# Patient Record
Sex: MALE | Race: WHITE | NOT HISPANIC OR LATINO | URBAN - METROPOLITAN AREA
[De-identification: names, ages, dates, MRNs, and addresses within clinical notes are randomized per-mention and may not be internally consistent; named-entity substitution may affect disease eponyms.]

---

## 2018-05-21 ENCOUNTER — EMERGENCY (EMERGENCY)
Facility: HOSPITAL | Age: 23
LOS: 1 days | Discharge: HOME | End: 2018-05-21
Admitting: EMERGENCY MEDICINE
Payer: COMMERCIAL

## 2018-05-21 VITALS
OXYGEN SATURATION: 100 % | RESPIRATION RATE: 16 BRPM | TEMPERATURE: 98 F | HEART RATE: 70 BPM | SYSTOLIC BLOOD PRESSURE: 145 MMHG | DIASTOLIC BLOOD PRESSURE: 95 MMHG
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
## 2018-05-21 PROCEDURE — 99283 EMERGENCY DEPT VISIT LOW MDM: CPT

## 2018-05-21 RX ORDER — FAMOTIDINE 10 MG/ML
20 INJECTION INTRAVENOUS ONCE
Qty: 0 | Refills: 0 | Status: COMPLETED | OUTPATIENT
Start: 2018-05-21 | End: 2018-05-21

## 2018-05-21 RX ORDER — DEXAMETHASONE 0.5 MG/5ML
10 ELIXIR ORAL ONCE
Qty: 0 | Refills: 0 | Status: COMPLETED | OUTPATIENT
Start: 2018-05-21 | End: 2018-05-21

## 2018-05-21 RX ORDER — DIPHENHYDRAMINE HCL 50 MG
25 CAPSULE ORAL EVERY 4 HOURS
Qty: 0 | Refills: 0 | Status: DISCONTINUED | OUTPATIENT
Start: 2018-05-21 | End: 2018-05-21

## 2018-05-21 RX ADMIN — FAMOTIDINE 20 MILLIGRAM(S): 10 INJECTION INTRAVENOUS at 17:54

## 2018-05-21 RX ADMIN — Medication 25 MILLIGRAM(S): at 17:54

## 2018-05-21 NOTE — ED ADULT TRIAGE NOTE - CHIEF COMPLAINT QUOTE
Allergic reaction after eating sushi with nuts , patient comes to ER complaining of tingles to lip and throat , no airway compromise noted at this time. Patient took benadryl 25mg piror to arrival

## 2018-05-21 NOTE — ED PROVIDER NOTE - MEDICAL DECISION MAKING DETAILS
pt with mild allergic response after eating a cashew and is very well appearing in ED.  pt deferred steroids and will continue benadryl q6h x 1-2 days and has epi pen on him and confirmed not .  return precautions discussed.

## 2018-05-21 NOTE — ED PROVIDER NOTE - CONSTITUTIONAL, MLM
normal... Well appearing, well nourished, awake, alert, oriented to person, place, time/situation and in no apparent distress. Speaking in full sentences, no drooling

## 2018-05-21 NOTE — ED PROVIDER NOTE - OBJECTIVE STATEMENT
24 y/o M w/ allergic reaction to nuts presents to ED w/ c/o allergic reaction s/p eating sushi w/ cashews 25 min ago. Pt reports "tingles" sensation to lip and throat. Has taken benadryl 25mg PTA. Denies difficulty breathing, difficulty swallowing, SOB. 22 y/o M w/ allergic reaction to nuts presents to ED w/ c/o allergic reaction s/p eating sushi w/ cashews 25 min ago. Pt reports "tingles" sensation to lip and throat. Has taken benadryl 25mg PTA. Denies difficulty breathing, difficulty swallowing, SOB.  Pt denies any throat swelling.

## 2018-05-25 DIAGNOSIS — Z91.010 ALLERGY TO PEANUTS: ICD-10-CM

## 2018-05-25 DIAGNOSIS — T78.1XXA OTHER ADVERSE FOOD REACTIONS, NOT ELSEWHERE CLASSIFIED, INITIAL ENCOUNTER: ICD-10-CM

## 2019-04-16 NOTE — ED PROVIDER NOTE - ENMT, MLM
Per family pt has been increasingly weak and confused, family states that this usually happens when his ammonia levels are elevated. 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 Pt is on Lactulose everyday and per wife he is compliant with it.
Airway patent, Nasal mucosa clear. Mouth with normal mucosa. Throat has no vesicles, no oropharyngeal exudates and uvula is midline. no soft palette edema